# Patient Record
Sex: FEMALE | Race: WHITE | NOT HISPANIC OR LATINO | URBAN - METROPOLITAN AREA
[De-identification: names, ages, dates, MRNs, and addresses within clinical notes are randomized per-mention and may not be internally consistent; named-entity substitution may affect disease eponyms.]

---

## 2023-06-20 ENCOUNTER — TELEPHONE (OUTPATIENT)
Dept: ALLERGY | Facility: CLINIC | Age: 78
End: 2023-06-20
Payer: MEDICARE

## 2023-06-20 NOTE — TELEPHONE ENCOUNTER
Called pt to assist with scheduling a new pt appointment with Dr. Weston    ----- Message from Halie Boyd sent at 6/20/2023 11:23 AM CDT -----  Radha Hinton calling regarding Appointment Access for being referred to the doctor for having Hives/rash, call back to schedule [phone]

## 2023-07-18 ENCOUNTER — OFFICE VISIT (OUTPATIENT)
Dept: ALLERGY | Facility: CLINIC | Age: 78
End: 2023-07-18
Payer: MEDICARE

## 2023-07-18 ENCOUNTER — LAB VISIT (OUTPATIENT)
Dept: LAB | Facility: HOSPITAL | Age: 78
End: 2023-07-18
Attending: ALLERGY & IMMUNOLOGY
Payer: MEDICARE

## 2023-07-18 VITALS
HEART RATE: 93 BPM | DIASTOLIC BLOOD PRESSURE: 84 MMHG | WEIGHT: 158.94 LBS | OXYGEN SATURATION: 96 % | SYSTOLIC BLOOD PRESSURE: 150 MMHG

## 2023-07-18 DIAGNOSIS — E03.9 HYPOTHYROIDISM, UNSPECIFIED TYPE: ICD-10-CM

## 2023-07-18 DIAGNOSIS — L50.1 CHRONIC IDIOPATHIC URTICARIA: ICD-10-CM

## 2023-07-18 DIAGNOSIS — L50.8 CHRONIC URTICARIA: Primary | ICD-10-CM

## 2023-07-18 LAB
BASOPHILS # BLD AUTO: 0.05 K/UL (ref 0–0.2)
BASOPHILS NFR BLD: 0.6 % (ref 0–1.9)
DIFFERENTIAL METHOD: NORMAL
EOSINOPHIL # BLD AUTO: 0.3 K/UL (ref 0–0.5)
EOSINOPHIL NFR BLD: 3.5 % (ref 0–8)
ERYTHROCYTE [DISTWIDTH] IN BLOOD BY AUTOMATED COUNT: 13.6 % (ref 11.5–14.5)
HCT VFR BLD AUTO: 45.6 % (ref 37–48.5)
HGB BLD-MCNC: 14.8 G/DL (ref 12–16)
IMM GRANULOCYTES # BLD AUTO: 0.02 K/UL (ref 0–0.04)
IMM GRANULOCYTES NFR BLD AUTO: 0.2 % (ref 0–0.5)
LYMPHOCYTES # BLD AUTO: 1.8 K/UL (ref 1–4.8)
LYMPHOCYTES NFR BLD: 21.4 % (ref 18–48)
MCH RBC QN AUTO: 29 PG (ref 27–31)
MCHC RBC AUTO-ENTMCNC: 32.5 G/DL (ref 32–36)
MCV RBC AUTO: 89 FL (ref 82–98)
MONOCYTES # BLD AUTO: 0.7 K/UL (ref 0.3–1)
MONOCYTES NFR BLD: 8.4 % (ref 4–15)
NEUTROPHILS # BLD AUTO: 5.4 K/UL (ref 1.8–7.7)
NEUTROPHILS NFR BLD: 65.9 % (ref 38–73)
NRBC BLD-RTO: 0 /100 WBC
PLATELET # BLD AUTO: 275 K/UL (ref 150–450)
PMV BLD AUTO: 10 FL (ref 9.2–12.9)
RBC # BLD AUTO: 5.1 M/UL (ref 4–5.4)
THYROGLOB AB SERPL IA-ACNC: <4 IU/ML (ref 0–3.9)
THYROPEROXIDASE IGG SERPL-ACNC: <6 IU/ML
TSH SERPL DL<=0.005 MIU/L-ACNC: 1.49 UIU/ML (ref 0.4–4)
WBC # BLD AUTO: 8.21 K/UL (ref 3.9–12.7)

## 2023-07-18 PROCEDURE — 85025 COMPLETE CBC W/AUTO DIFF WBC: CPT | Performed by: ALLERGY & IMMUNOLOGY

## 2023-07-18 PROCEDURE — 99204 PR OFFICE/OUTPT VISIT, NEW, LEVL IV, 45-59 MIN: ICD-10-PCS | Mod: S$PBB,,, | Performed by: ALLERGY & IMMUNOLOGY

## 2023-07-18 PROCEDURE — 84165 PATHOLOGIST INTERPRETATION SPE: ICD-10-PCS | Mod: 26,,, | Performed by: PATHOLOGY

## 2023-07-18 PROCEDURE — 99999 PR PBB SHADOW E&M-EST. PATIENT-LVL III: ICD-10-PCS | Mod: PBBFAC,,, | Performed by: ALLERGY & IMMUNOLOGY

## 2023-07-18 PROCEDURE — 86003 ALLG SPEC IGE CRUDE XTRC EA: CPT | Mod: 59 | Performed by: ALLERGY & IMMUNOLOGY

## 2023-07-18 PROCEDURE — 86003 ALLG SPEC IGE CRUDE XTRC EA: CPT | Performed by: ALLERGY & IMMUNOLOGY

## 2023-07-18 PROCEDURE — 86800 THYROGLOBULIN ANTIBODY: CPT | Performed by: ALLERGY & IMMUNOLOGY

## 2023-07-18 PROCEDURE — 84443 ASSAY THYROID STIM HORMONE: CPT | Performed by: ALLERGY & IMMUNOLOGY

## 2023-07-18 PROCEDURE — 84165 PROTEIN E-PHORESIS SERUM: CPT | Performed by: ALLERGY & IMMUNOLOGY

## 2023-07-18 PROCEDURE — 84165 PROTEIN E-PHORESIS SERUM: CPT | Mod: 26,,, | Performed by: PATHOLOGY

## 2023-07-18 PROCEDURE — 99999 PR PBB SHADOW E&M-EST. PATIENT-LVL III: CPT | Mod: PBBFAC,,, | Performed by: ALLERGY & IMMUNOLOGY

## 2023-07-18 PROCEDURE — 36415 COLL VENOUS BLD VENIPUNCTURE: CPT | Performed by: ALLERGY & IMMUNOLOGY

## 2023-07-18 PROCEDURE — 99213 OFFICE O/P EST LOW 20 MIN: CPT | Mod: PBBFAC | Performed by: ALLERGY & IMMUNOLOGY

## 2023-07-18 PROCEDURE — 99204 OFFICE O/P NEW MOD 45 MIN: CPT | Mod: S$PBB,,, | Performed by: ALLERGY & IMMUNOLOGY

## 2023-07-18 RX ORDER — HYDROCHLOROTHIAZIDE 25 MG/1
1 TABLET ORAL DAILY
COMMUNITY
Start: 2023-03-06 | End: 2023-12-12 | Stop reason: SDUPTHER

## 2023-07-18 RX ORDER — FAMOTIDINE 20 MG/1
20 TABLET, FILM COATED ORAL 2 TIMES DAILY
COMMUNITY
Start: 2023-06-26 | End: 2023-09-26 | Stop reason: SDUPTHER

## 2023-07-18 RX ORDER — AMLODIPINE BESYLATE 5 MG/1
5 TABLET ORAL
COMMUNITY
Start: 2023-06-28

## 2023-07-18 RX ORDER — LEVOTHYROXINE SODIUM 25 UG/1
25 TABLET ORAL
COMMUNITY
Start: 2023-05-19 | End: 2023-12-12 | Stop reason: SDUPTHER

## 2023-07-18 RX ORDER — ESZOPICLONE 3 MG/1
1 TABLET, FILM COATED ORAL NIGHTLY PRN
COMMUNITY
Start: 2023-03-16 | End: 2023-12-12 | Stop reason: SDUPTHER

## 2023-07-18 RX ORDER — HYDROXYZINE HYDROCHLORIDE 50 MG/1
50 TABLET, FILM COATED ORAL 3 TIMES DAILY PRN
COMMUNITY

## 2023-07-18 RX ORDER — AMLODIPINE BESYLATE 5 MG/1
1 TABLET ORAL DAILY
COMMUNITY
Start: 2023-06-28 | End: 2023-12-12 | Stop reason: SDUPTHER

## 2023-07-18 RX ORDER — NABUMETONE 500 MG/1
TABLET, FILM COATED ORAL
COMMUNITY
Start: 2023-07-17

## 2023-07-18 RX ORDER — HYDROCHLOROTHIAZIDE 25 MG/1
25 TABLET ORAL
COMMUNITY
Start: 2023-06-01

## 2023-07-18 RX ORDER — HYDROXYZINE HYDROCHLORIDE 25 MG/1
TABLET, FILM COATED ORAL
COMMUNITY
Start: 2023-06-24

## 2023-07-18 RX ORDER — LEVOTHYROXINE SODIUM 25 UG/1
TABLET ORAL
COMMUNITY
Start: 2023-05-19

## 2023-07-18 RX ORDER — CETIRIZINE HYDROCHLORIDE 10 MG/1
20 TABLET ORAL 2 TIMES DAILY
Qty: 120 TABLET | Refills: 6 | Status: SHIPPED | OUTPATIENT
Start: 2023-07-18 | End: 2024-07-17

## 2023-07-18 RX ORDER — FERROUS SULFATE, DRIED 160(50) MG
1 TABLET, EXTENDED RELEASE ORAL
COMMUNITY

## 2023-07-18 RX ORDER — POTASSIUM CHLORIDE 1500 MG/1
20 TABLET, EXTENDED RELEASE ORAL
COMMUNITY
Start: 2023-03-04

## 2023-07-18 RX ORDER — ESZOPICLONE 3 MG/1
3 TABLET, FILM COATED ORAL NIGHTLY
COMMUNITY
Start: 2023-03-16

## 2023-07-18 NOTE — PROGRESS NOTES
Subjective:       Patient ID: Radha Hinton is a 77 y.o. female.      Chief Complaint:  Allergies, Urticaria, and Rash (First consultation for Hives.  In August 2022, she had a terrible itch and a rash that was persistent and now it has returned.  Previously she was prescribed Zyrtec and Allegra by her Dermatologist and she took as prescribed with good effect, now the same issue has come back and she went back to her Dermatologist who told her it is time to see an Allergists. )      HPI:   Radha Hinton is here for evaluation of chronic urticaria. Patient's symptoms include urticaria. Hives are described as a red, raised, itchy, and spreading skin rash that occurs on the entire body. Different areas at different time. The patient has had these symptoms for 1 year. Started in Aug '22. In early '23 had a few months w/o urticaria. It recurred in April '23.  Each individual hive lasts less than 24 hours. These lesions are pruritic and not painful. They heal without scarring. The patient has tried the following medications for control of these symptoms: over-the-counter antihistamines and prescription antihistamines. Currently on 10 mg certirizine and 20 mg famotidine twice daily with 25 mg hydroxyzine at night. These medications offer good relief of symptoms.  There has not been laryngeal/throat involvement. The patient has not required Emergency Room evaluation and treatment for these symptoms.  Possible triggers have not been identified.   Recent illness: no  Association w/ particular foods no  Environmental triggers:  more commonly noted inside  Association w NSAIDs: no  Worse w physical pressure (dermatographism):no  Worse w temperature extremes:no  Worse w exercise/exertion:no  Worse w stress: no. Not obvious.  She is on synthroid for hypothyroidism  Reports she is otherwise in baseline state of health. No constitutional sx's.  Prev seen by dermatologist, Dr. Saldana    No AR, no asthma, no food  allergy        Past Medical History:   Diagnosis Date    Allergy     Urticaria        History reviewed. No pertinent family history.      Review of Systems   Constitutional:  Negative for activity change, fatigue and fever.   HENT:  Negative for congestion, postnasal drip, rhinorrhea, sinus pressure and sneezing.    Eyes:  Negative for discharge, redness and itching.   Respiratory:  Negative for cough, shortness of breath and wheezing.    Cardiovascular:  Negative for chest pain.   Gastrointestinal:  Negative for diarrhea, nausea and vomiting.   Genitourinary:  Negative for dysuria.   Musculoskeletal:  Negative for arthralgias and joint swelling.   Skin:  Negative for rash.   Neurological:  Negative for headaches.   Hematological:  Does not bruise/bleed easily.   Psychiatric/Behavioral:  Negative for behavioral problems and sleep disturbance.         Objective:   Physical Exam  Vitals and nursing note reviewed.   Constitutional:       General: She is not in acute distress.     Appearance: She is well-developed.   HENT:      Head: Normocephalic.      Right Ear: External ear normal.      Left Ear: External ear normal.      Nose: No septal deviation, mucosal edema or rhinorrhea.      Right Sinus: No maxillary sinus tenderness or frontal sinus tenderness.      Left Sinus: No maxillary sinus tenderness or frontal sinus tenderness.      Mouth/Throat:      Pharynx: Uvula midline. No uvula swelling.   Eyes:      General:         Right eye: No discharge.         Left eye: No discharge.   Cardiovascular:      Rate and Rhythm: Normal rate.   Pulmonary:      Effort: Pulmonary effort is normal. No respiratory distress.   Abdominal:      Tenderness: There is no abdominal tenderness.   Musculoskeletal:      Cervical back: Normal range of motion.   Lymphadenopathy:      Cervical: No cervical adenopathy.   Skin:     Findings: No erythema or rash.   Neurological:      Mental Status: She is alert and oriented to person, place, and  time.   Psychiatric:         Behavior: Behavior normal.     Recent labs in epic reviewed      Assessment:       1. Chronic urticaria, consider spontaneous v autoimmune   2. Hypothyroidism, unspecified type         Plan:       Radha was seen today for allergies, urticaria and rash.    Diagnoses and all orders for this visit:    Chronic urticaria  -     cetirizine (ZYRTEC) 10 MG tablet; Take 2 tablets (20 mg total) by mouth 2 (two) times a day. For chronic urticaria  -famotidine 20 mg twice daily  -wean atarax at night to prn    -     CBC Auto Differential; Future  -     Thyroid Peroxidase Antibody; Future  -     Anti-Thyroglobulin Antibody; Future  -     TSH; Future  -     PROTEIN ELECTROPHORESIS, SERUM; Future  -     D. pteronyssinus IgE; Future  -     D. farinae IgE; Future    Hypothyroidism, unspecified type  -     Thyroid Peroxidase Antibody; Future  -     Anti-Thyroglobulin Antibody; Future  -     TSH; Future         Total of 50+ minutes on encounter the day of the visit. This includes face to face time and non-face to face time preparing to see the patient (eg, review of tests), obtaining and/or reviewing separately obtained history, documenting clinical information in the electronic or other health record, independently interpreting results and communicating results to the patient/family/caregiver, or care coordinator.

## 2023-07-19 LAB
ALBUMIN SERPL ELPH-MCNC: 4.53 G/DL (ref 3.35–5.55)
ALPHA1 GLOB SERPL ELPH-MCNC: 0.32 G/DL (ref 0.17–0.41)
ALPHA2 GLOB SERPL ELPH-MCNC: 0.99 G/DL (ref 0.43–0.99)
B-GLOBULIN SERPL ELPH-MCNC: 0.79 G/DL (ref 0.5–1.1)
GAMMA GLOB SERPL ELPH-MCNC: 0.68 G/DL (ref 0.67–1.58)
PATHOLOGIST INTERPRETATION SPE: NORMAL
PROT SERPL-MCNC: 7.3 G/DL (ref 6–8.4)

## 2023-07-21 LAB
D FARINAE IGE QN: <0.1 KU/L
D PTERONYSS IGE QN: <0.1 KU/L
DEPRECATED D FARINAE IGE RAST QL: NORMAL
DEPRECATED D PTERONYSS IGE RAST QL: NORMAL

## 2023-07-25 ENCOUNTER — OFFICE VISIT (OUTPATIENT)
Dept: ALLERGY | Facility: CLINIC | Age: 78
End: 2023-07-25
Payer: MEDICARE

## 2023-07-25 VITALS
HEART RATE: 89 BPM | DIASTOLIC BLOOD PRESSURE: 90 MMHG | SYSTOLIC BLOOD PRESSURE: 150 MMHG | WEIGHT: 160.94 LBS | OXYGEN SATURATION: 98 %

## 2023-07-25 DIAGNOSIS — E03.9 HYPOTHYROIDISM, UNSPECIFIED TYPE: ICD-10-CM

## 2023-07-25 DIAGNOSIS — L50.8 CHRONIC URTICARIA: Primary | ICD-10-CM

## 2023-07-25 PROCEDURE — 99999 PR PBB SHADOW E&M-EST. PATIENT-LVL III: CPT | Mod: PBBFAC,,, | Performed by: ALLERGY & IMMUNOLOGY

## 2023-07-25 PROCEDURE — 99214 PR OFFICE/OUTPT VISIT, EST, LEVL IV, 30-39 MIN: ICD-10-PCS | Mod: S$PBB,,, | Performed by: ALLERGY & IMMUNOLOGY

## 2023-07-25 PROCEDURE — 99214 OFFICE O/P EST MOD 30 MIN: CPT | Mod: S$PBB,,, | Performed by: ALLERGY & IMMUNOLOGY

## 2023-07-25 PROCEDURE — 99213 OFFICE O/P EST LOW 20 MIN: CPT | Mod: PBBFAC | Performed by: ALLERGY & IMMUNOLOGY

## 2023-07-25 PROCEDURE — 99999 PR PBB SHADOW E&M-EST. PATIENT-LVL III: ICD-10-PCS | Mod: PBBFAC,,, | Performed by: ALLERGY & IMMUNOLOGY

## 2023-07-25 RX ORDER — HYDROXYZINE HYDROCHLORIDE 25 MG/1
25 TABLET, FILM COATED ORAL 3 TIMES DAILY PRN
Qty: 60 TABLET | Refills: 4 | Status: SHIPPED | OUTPATIENT
Start: 2023-07-25

## 2023-07-25 RX ORDER — PREDNISONE 10 MG/1
TABLET ORAL
Qty: 21 TABLET | Refills: 1 | Status: SHIPPED | OUTPATIENT
Start: 2023-07-25

## 2023-07-25 NOTE — PROGRESS NOTES
Subjective:       Patient ID: Radha Hinton is a 77 y.o. female.      Chief Complaint:  Allergies and Follow-up (Follow Up for allergies and to discuss Labs results, and she has had no relief she states she is taking twice as much meds with not too good effects. )      HPI:     Pt presents w  for fu chronic spontaneous urticaria. Labs from  were unrmarkable.  Continues to c/o frequent pruritus, occ outbreaks of urticaria, despite 20 mg cetirizine and 20 mg famotidine twice daily on routine basis. Almost nightly takes 1-2 extra doses 25 mg hydroxyzine for breakthrough itching, and frequently takes extra 10 mg cetirizine mid day for breakthrough pruritus. Duration of benefits from antihistamines seems to be about 1/2 of what is expected. Pruritus is more prominent, frequent than appearance of urticaria lately.    Hx from 7/18/23:  Radha Hinton is here for evaluation of chronic urticaria. Patient's symptoms include urticaria. Hives are described as a red, raised, itchy, and spreading skin rash that occurs on the entire body. Different areas at different time. The patient has had these symptoms for 1 year. Started in Aug '22. In early '23 had a few months w/o urticaria. It recurred in April '23.  Each individual hive lasts less than 24 hours. These lesions are pruritic and not painful. They heal without scarring. The patient has tried the following medications for control of these symptoms: over-the-counter antihistamines and prescription antihistamines. Currently on 10 mg certirizine and 20 mg famotidine twice daily with 25 mg hydroxyzine at night. These medications offer good relief of symptoms.  There has not been laryngeal/throat involvement. The patient has not required Emergency Room evaluation and treatment for these symptoms.  Possible triggers have not been identified.   Recent illness: no  Association w/ particular foods no  Environmental triggers:  more commonly noted inside  Association w  NSAIDs: no  Worse w physical pressure (dermatographism):no  Worse w temperature extremes:no  Worse w exercise/exertion:no  Worse w stress: no. Not obvious.  She is on synthroid for hypothyroidism  Reports she is otherwise in baseline state of health. No constitutional sx's.  Prev seen by dermatologist, Dr. Saldana    No AR, no asthma, no food allergy        Past Medical History:   Diagnosis Date    Allergy     Urticaria        History reviewed. No pertinent family history.      Review of Systems   Constitutional:  Negative for activity change, fatigue and fever.   HENT:  Negative for congestion, postnasal drip, rhinorrhea, sinus pressure and sneezing.    Eyes:  Negative for discharge, redness and itching.   Respiratory:  Negative for cough, shortness of breath and wheezing.    Cardiovascular:  Negative for chest pain.   Gastrointestinal:  Negative for diarrhea, nausea and vomiting.   Genitourinary:  Negative for dysuria.   Musculoskeletal:  Negative for arthralgias and joint swelling.   Skin:  Negative for rash.        Pruritus, urticaria   Neurological:  Negative for headaches.   Hematological:  Does not bruise/bleed easily.   Psychiatric/Behavioral:  Negative for behavioral problems and sleep disturbance.         Objective:   Physical Exam  Vitals and nursing note reviewed.   Constitutional:       General: She is not in acute distress.     Appearance: She is well-developed.   HENT:      Head: Normocephalic.      Right Ear: External ear normal.      Left Ear: External ear normal.      Nose: No septal deviation, mucosal edema or rhinorrhea.      Right Sinus: No maxillary sinus tenderness or frontal sinus tenderness.      Left Sinus: No maxillary sinus tenderness or frontal sinus tenderness.      Mouth/Throat:      Pharynx: Uvula midline. No uvula swelling.   Eyes:      General:         Right eye: No discharge.         Left eye: No discharge.   Cardiovascular:      Rate and Rhythm: Normal rate.   Pulmonary:       Effort: Pulmonary effort is normal. No respiratory distress.   Abdominal:      Tenderness: There is no abdominal tenderness.   Musculoskeletal:      Cervical back: Normal range of motion.   Lymphadenopathy:      Cervical: No cervical adenopathy.   Skin:     Findings: No erythema or rash.   Neurological:      Mental Status: She is alert and oriented to person, place, and time.   Psychiatric:         Behavior: Behavior normal.     Recent labs in epic reviewed  Nl cbc, tsh, spep, cmp. Neg IgE to DM      Assessment:       1. Chronic urticaria, consider spontaneous v autoimmune   2. Hypothyroidism, unspecified type         Plan:       Radha was seen today for allergies, urticaria and rash.    Diagnoses and all orders for this visit:    Chronic urticaria  -     cetirizine (ZYRTEC) 10 MG tablet; Take 2 tablets (20 mg total) by mouth 2 (two) times a day. For chronic urticaria  -famotidine 20 mg twice daily  -prn atarax for breakthrough itching    Recommend xolair. Pt will consider and let us know if/when ready for me to write rx.    Meanwhile can try previously rx'd gabapentin  Rec aggressive moisturization of skin             Total of 40 minutes on encounter the day of the visit. This includes face to face time and non-face to face time preparing to see the patient (eg, review of tests), obtaining and/or reviewing separately obtained history, documenting clinical information in the electronic or other health record, independently interpreting results and communicating results to the patient/family/caregiver, or care coordinator.

## 2023-07-31 ENCOUNTER — LAB VISIT (OUTPATIENT)
Dept: LAB | Facility: HOSPITAL | Age: 78
End: 2023-07-31
Payer: MEDICARE

## 2023-07-31 ENCOUNTER — OFFICE VISIT (OUTPATIENT)
Dept: ALLERGY | Facility: CLINIC | Age: 78
End: 2023-07-31
Payer: MEDICARE

## 2023-07-31 VITALS
SYSTOLIC BLOOD PRESSURE: 137 MMHG | HEART RATE: 93 BPM | OXYGEN SATURATION: 97 % | WEIGHT: 160.69 LBS | DIASTOLIC BLOOD PRESSURE: 72 MMHG

## 2023-07-31 DIAGNOSIS — E55.9 VITAMIN D INSUFFICIENCY: ICD-10-CM

## 2023-07-31 DIAGNOSIS — E03.9 HYPOTHYROIDISM, UNSPECIFIED TYPE: ICD-10-CM

## 2023-07-31 DIAGNOSIS — L50.1 IDIOPATHIC URTICARIA: ICD-10-CM

## 2023-07-31 DIAGNOSIS — E55.9 VITAMIN D INSUFFICIENCY: Primary | ICD-10-CM

## 2023-07-31 DIAGNOSIS — G45.9 TIA (TRANSIENT ISCHEMIC ATTACK): ICD-10-CM

## 2023-07-31 DIAGNOSIS — T78.3XXA ANGIOEDEMA, INITIAL ENCOUNTER: ICD-10-CM

## 2023-07-31 DIAGNOSIS — I25.10 ASCVD (ARTERIOSCLEROTIC CARDIOVASCULAR DISEASE): ICD-10-CM

## 2023-07-31 DIAGNOSIS — I10 HYPERTENSION, ESSENTIAL: ICD-10-CM

## 2023-07-31 LAB — 25(OH)D3+25(OH)D2 SERPL-MCNC: 96 NG/ML (ref 30–96)

## 2023-07-31 PROCEDURE — 36415 COLL VENOUS BLD VENIPUNCTURE: CPT | Performed by: ALLERGY & IMMUNOLOGY

## 2023-07-31 PROCEDURE — 82306 VITAMIN D 25 HYDROXY: CPT | Performed by: ALLERGY & IMMUNOLOGY

## 2023-07-31 PROCEDURE — 99999 PR PBB SHADOW E&M-EST. PATIENT-LVL III: ICD-10-PCS | Mod: PBBFAC,,, | Performed by: ALLERGY & IMMUNOLOGY

## 2023-07-31 PROCEDURE — 99213 OFFICE O/P EST LOW 20 MIN: CPT | Mod: PBBFAC | Performed by: ALLERGY & IMMUNOLOGY

## 2023-07-31 PROCEDURE — 99215 OFFICE O/P EST HI 40 MIN: CPT | Mod: S$PBB,,, | Performed by: ALLERGY & IMMUNOLOGY

## 2023-07-31 PROCEDURE — 99215 PR OFFICE/OUTPT VISIT, EST, LEVL V, 40-54 MIN: ICD-10-PCS | Mod: S$PBB,,, | Performed by: ALLERGY & IMMUNOLOGY

## 2023-07-31 PROCEDURE — 88184 FLOWCYTOMETRY/ TC 1 MARKER: CPT | Performed by: ALLERGY & IMMUNOLOGY

## 2023-07-31 PROCEDURE — 99999 PR PBB SHADOW E&M-EST. PATIENT-LVL III: CPT | Mod: PBBFAC,,, | Performed by: ALLERGY & IMMUNOLOGY

## 2023-07-31 NOTE — PROGRESS NOTES
Radha Hinton is a 77-year-old female who presents to clinic today for continued evaluation of urticaria and angioedema.  She is here with her  Rakesh.      She last saw Dr. Trujillo in Allergy on July 25, 2023.  She initially saw him on July 18, 2023 on referral from Dr. Christine Saldana her dermatologist.    She was diagnosed with hypothyroidism in 2021 with an elevated TSH of 5.20 on January 14, 2022.  She has been on levothyroxine since then.  Her TSH since then has been normal.  She is followed by her primary care physician Dr. Oscar Elizalde.    In August 2022 she developed increased itching with linear lesions where she scratched. She then developed increased urticarial lesions that were red, raised, and pruritic.    She saw Dr. Saldana on August 23, 2022 and was started on Zyrtec and Allegra.  She took one Zyrtec in the morning for a month and then one Allegra at night for two months.  She did this and when she discontinued the Allegra, she did not have any further urticaria for many months.      She had a right knee replacement on April 25, 2023.  Two weeks prior to this she developed increased itching with the urticarial rash again.    Her symptoms have persisted since then.  She has been taking Zyrtec up to four day recently and Pepcid 20 milligrams twice a day. She has also been taking one or two hydroxyzine at night.    She tolerates the hydroxyzine and does have difficulty sleeping.    She has hypertension and was taking potassium supplements. She was told not to take this with Benadryl.    She denies any rhinitis, conjunctivitis, cough, wheezing, shortness of breath, or history of asthma.    For ROS, FH, SH please see Allergy and Asthma Questionnaire dated today.    Some relevant pertinent positives:    Review of Systems/PMH:  She had a right hip replacement on June 14, 2022.  She had a right knee replacement on April 25, 2023.  She had a TIA in 2001 and has been on aspirin daily since then.   She is hypertension and takes hydrochlorothiazide and amlodipine.    Family History:  Negative for atopic disease.    Home environment:  She has lived in the same house for the past 16 years. There was no water damage. There is no evidence of mold. There is no carpeting in the bedroom. There are no pets.    Social History:  She was born in [place] and moved to the [place] in early childhood.  Her parents were Holocaust survivors.  She is a retired teacher.  Her  is a retired .    Physical Examination:  General: Well-developed, well-nourished, no acute distress.  Head: No sinus tenderness.  Eyes: Conjunctivae:  No bulbar or palpebral conjunctival injection.  Ears: EAC's clear.  TM's clear.  No pre-auricular nodes.  Nose: Nasal Mucosa:  Pink.  Septum: No apparent deviation.  Turbinates:  No significant edema.  Polyps/Mass:  None visible.  Teeth/Gums:  No bleeding noted.  Oropharynx: No exudates.  Neck: Supple without thyromegaly. No cervical lymphadenopathy.    Respiratory/Chest: Effort: Good.  Auscultation:  Clear bilaterally.  Cardiovascular:  No murmur, rubs, or gallop heard.   GI:  Non-tender.  No masses.  No organomegaly.  Extremities:  No cyanosis, clubbing, or edema.  Skin: Good turgor.  No urticaria or angioedema.  Neuro/Psych: Oriented x 3.    Pictures are reviewed on her cell phone on July 31, 2023 and they are consistent with urticaria.    Laboratory 07/18/2023:   ImmunoCAP dust mite: Negative.  CBC: Normal.   TSH:  1.493.   Thyroid peroxidase antibody level:  Less than 6.0.   Thyroglobulin antibody level:  Less than 4.0.   SPEP:  Normal.    Assessment:  1. Chronic recurrent urticaria and angioedema, probably idiopathic.   2. Hypothyroidism on replacement.   3. 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    4. 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    5. TIA 2001 on aspirin.   6. Hypertension on hydrochlorothiazide and amlodipine.    Recommendations:  One.  Laboratory as  ordered.  2. Continue Zyrtec 20 milligrams twice a day.  3. Increase Pepcid to 40 milligrams twice a day.   4. Atarax 25 milligrams at bedtime. She may increase this to 150 milligrams at night as tolerated.  5. Consider Xolair.   6. Return to clinic in one week.     Patient education August 27, 2023:   Allergic mechanisms and treatment options were reviewed in detail. Urticaria and angioedema were reviewed. Xolair was discussed.    I spent a total of 90 minutes on the day of the visit.This includes face to face time and non-face to face time preparing to see the patient (eg, review of tests), obtaining and/or reviewing separately obtained history, documenting clinical information in the electronic or other health record, independently interpreting results and communicating results to the patient/family/caregiver, or care coordinator.

## 2023-08-02 LAB — TRYPTASE LEVEL: 5 NG/ML

## 2023-08-12 LAB
BASOPHILS %, CD203C: 6.5 % (ref 0–12)
IGE RECEPTOR AB INTERPRETATION:: NORMAL

## 2023-09-26 ENCOUNTER — OFFICE VISIT (OUTPATIENT)
Dept: ALLERGY | Facility: CLINIC | Age: 78
End: 2023-09-26
Payer: MEDICARE

## 2023-09-26 VITALS — BODY MASS INDEX: 30.44 KG/M2 | HEIGHT: 62 IN | WEIGHT: 165.38 LBS

## 2023-09-26 DIAGNOSIS — I25.10 ASCVD (ARTERIOSCLEROTIC CARDIOVASCULAR DISEASE): ICD-10-CM

## 2023-09-26 DIAGNOSIS — I10 HYPERTENSION, ESSENTIAL: ICD-10-CM

## 2023-09-26 DIAGNOSIS — T78.3XXD ANGIOEDEMA, SUBSEQUENT ENCOUNTER: ICD-10-CM

## 2023-09-26 DIAGNOSIS — L50.1 IDIOPATHIC URTICARIA: Primary | ICD-10-CM

## 2023-09-26 DIAGNOSIS — E03.9 HYPOTHYROIDISM, UNSPECIFIED TYPE: ICD-10-CM

## 2023-09-26 PROCEDURE — 99999 PR PBB SHADOW E&M-EST. PATIENT-LVL III: CPT | Mod: PBBFAC,,, | Performed by: ALLERGY & IMMUNOLOGY

## 2023-09-26 PROCEDURE — 99214 OFFICE O/P EST MOD 30 MIN: CPT | Mod: S$PBB,,, | Performed by: ALLERGY & IMMUNOLOGY

## 2023-09-26 PROCEDURE — 99999 PR PBB SHADOW E&M-EST. PATIENT-LVL III: ICD-10-PCS | Mod: PBBFAC,,, | Performed by: ALLERGY & IMMUNOLOGY

## 2023-09-26 PROCEDURE — 99214 PR OFFICE/OUTPT VISIT, EST, LEVL IV, 30-39 MIN: ICD-10-PCS | Mod: S$PBB,,, | Performed by: ALLERGY & IMMUNOLOGY

## 2023-09-26 PROCEDURE — 99213 OFFICE O/P EST LOW 20 MIN: CPT | Mod: PBBFAC | Performed by: ALLERGY & IMMUNOLOGY

## 2023-09-26 RX ORDER — FAMOTIDINE 20 MG/1
40 TABLET, FILM COATED ORAL 2 TIMES DAILY
Qty: 180 TABLET | Refills: 3 | Status: SHIPPED | OUTPATIENT
Start: 2023-09-26

## 2023-09-26 RX ORDER — FAMOTIDINE 20 MG/1
1 TABLET, FILM COATED ORAL 2 TIMES DAILY
COMMUNITY
Start: 2023-06-26 | End: 2023-12-12 | Stop reason: SDUPTHER

## 2023-09-26 RX ORDER — ROSUVASTATIN CALCIUM 10 MG/1
1 TABLET, COATED ORAL DAILY
COMMUNITY
Start: 2023-09-12 | End: 2023-11-07

## 2023-09-26 RX ORDER — ROSUVASTATIN CALCIUM 10 MG/1
10 TABLET, COATED ORAL
COMMUNITY
Start: 2023-09-12

## 2023-09-26 NOTE — PROGRESS NOTES
Radha Hinton returns to clinic today for continued evaluation of chronic urticaria and angioedema.  She was last seen July 31, 2023. She is with her  Rakesh.    Since her last visit, she is been taking Zyrtec 20 milligrams twice a day as well as Pepcid 40 milligrams twice a day.     While on this her urticaria and angioedema have been well-controlled.  She has only had a few small lesions and some itching.  She has not had any angioedema.    Since she has been so well controlled.  She has on several occasions tried to reduce her Pepcid.  She did have some increased itching and has now been taken 40 milligrams twice a day for the past three days.     She has tried Atarax on several occasions. She does have difficulty sleeping and takes Lunesta.  She does sleep better after taking Atarax.    She denies any rhinitis or conjunctivitis. She denies any cough, wheezing, shortness of breath, or history of asthma.    Physical Examination:  General: Well-developed, well-nourished, no acute distress.  Skin: Good turgor.  No urticaria or angioedema.  Neuro/Psych: Oriented x 3.    Pictures are reviewed on her cell phone on July 31, 2023 and they are consistent with urticaria.    Laboratory 07/18/2023:   ImmunoCAP dust mite: Negative.  CBC: Normal.   TSH:  1.493.   Thyroid peroxidase antibody level:  Less than 6.0.   Thyroglobulin antibody level:  Less than 4.0.   SPEP:  Normal.    Laboratory 07/31/2023:   Anti IgE receptor antibody level:  6.5.   Vitamin-D level:  96.   Serum tryptase:  5.0.    Assessment:  1. Chronic recurrent urticaria and angioedema, probably idiopathic.   2. Hypothyroidism on replacement.   3. 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    4. 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    5. TIA 2001 on aspirin.   6. Hypertension on hydrochlorothiazide and amlodipine.    Recommendations:  1. Continue Zyrtec 20 milligrams twice a day.   2. Continue Pepcid 40 milligrams twice a day.  She may begin to reduce the  Pepcid by 20 milligrams in the afternoon.  She does have 20 milligram tablets at home.  3. Atarax as needed.   4. Xolair again discussed.  5. Return to clinic in 6 to 8 weeks or sooner if needed.    Patient education August 27, 2023:   Allergic mechanisms and treatment options were reviewed in detail. Urticaria and angioedema were reviewed. Xolair was discussed.

## 2023-11-07 ENCOUNTER — OFFICE VISIT (OUTPATIENT)
Dept: ALLERGY | Facility: CLINIC | Age: 78
End: 2023-11-07
Payer: MEDICARE

## 2023-11-07 VITALS
WEIGHT: 169.06 LBS | BODY MASS INDEX: 30.93 KG/M2 | SYSTOLIC BLOOD PRESSURE: 133 MMHG | DIASTOLIC BLOOD PRESSURE: 70 MMHG | HEART RATE: 77 BPM

## 2023-11-07 DIAGNOSIS — E03.9 HYPOTHYROIDISM, UNSPECIFIED TYPE: ICD-10-CM

## 2023-11-07 DIAGNOSIS — L50.8 URTICARIA, CHRONIC: Primary | ICD-10-CM

## 2023-11-07 DIAGNOSIS — T78.3XXD ANGIOEDEMA, SUBSEQUENT ENCOUNTER: ICD-10-CM

## 2023-11-07 PROCEDURE — 99999 PR PBB SHADOW E&M-EST. PATIENT-LVL III: CPT | Mod: PBBFAC,,, | Performed by: ALLERGY & IMMUNOLOGY

## 2023-11-07 PROCEDURE — 99999 PR PBB SHADOW E&M-EST. PATIENT-LVL III: ICD-10-PCS | Mod: PBBFAC,,, | Performed by: ALLERGY & IMMUNOLOGY

## 2023-11-07 PROCEDURE — 99213 OFFICE O/P EST LOW 20 MIN: CPT | Mod: PBBFAC | Performed by: ALLERGY & IMMUNOLOGY

## 2023-11-07 PROCEDURE — 99214 OFFICE O/P EST MOD 30 MIN: CPT | Mod: S$PBB,,, | Performed by: ALLERGY & IMMUNOLOGY

## 2023-11-07 PROCEDURE — 99214 PR OFFICE/OUTPT VISIT, EST, LEVL IV, 30-39 MIN: ICD-10-PCS | Mod: S$PBB,,, | Performed by: ALLERGY & IMMUNOLOGY

## 2023-11-07 NOTE — PROGRESS NOTES
Radha Hinton returns to clinic today for continued evaluation of chronic urticaria and angioedema.  She is here with her  Rakesh.  She was last seen September 26, 2021.    After her last visit, she started reduce her famotidine.  She now is not taking any.  She continues to take Zyrtec two in the morning and two in the afternoon.     She occasionally takes an Atarax. This is usually to sleep.  She does not needed for her urticaria.    She has not had any angioedema.    She has had some mild itching but no urticarial lesions.    She has not had any rhinitis or conjunctivitis.  She denies any cough, wheezing, or shortness of breath.    She has been having some lower back pain.  She was told not to use a heating pad as it may increase her urticaria.    Physical Examination:  General: Well-developed, well-nourished, no acute distress.  Skin: Good turgor.  No urticaria or angioedema.  Neuro/Psych: Oriented x 3.    Pictures are reviewed on her cell phone on July 31, 2023 and they are consistent with urticaria.    Laboratory 07/18/2023:   ImmunoCAP dust mite: Negative.  CBC: Normal.   TSH:  1.493.   Thyroid peroxidase antibody level:  Less than 6.0.   Thyroglobulin antibody level:  Less than 4.0.   SPEP:  Normal.    Laboratory 07/31/2023:   Anti IgE receptor antibody level:  6.5.   Vitamin-D level:  96.   Serum tryptase:  5.0.    Assessment:  1. Chronic recurrent urticaria and angioedema, probably idiopathic, improved.   2. Hypothyroidism on replacement.   3. 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    4. 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    5. TIA 2001 on aspirin.   6. Hypertension on hydrochlorothiazide and amlodipine.    Recommendations:  1. Continue Zyrtec 20 milligrams twice a day.   2. Begin to reduce the Zyrtec.  Decrease to 20 milligrams in the morning and 10 milligrams at night.  If doing well after Thanksgiving, decrease to 10 milligrams in the morning and 10 milligrams in the afternoon.  3. Atarax as  needed.   4. She may use heating pad if needed.    5. Return to clinic in 6 to 8 weeks or sooner if needed.    Patient education August 27, 2023:   Allergic mechanisms and treatment options were reviewed in detail. Urticaria and angioedema were reviewed. Xolair was discussed.

## 2023-12-12 ENCOUNTER — OFFICE VISIT (OUTPATIENT)
Dept: ALLERGY | Facility: CLINIC | Age: 78
End: 2023-12-12
Payer: MEDICARE

## 2023-12-12 VITALS — HEIGHT: 62 IN | WEIGHT: 169.75 LBS | BODY MASS INDEX: 31.24 KG/M2

## 2023-12-12 DIAGNOSIS — E03.9 HYPOTHYROIDISM, UNSPECIFIED TYPE: ICD-10-CM

## 2023-12-12 DIAGNOSIS — L50.8 URTICARIA, CHRONIC: Primary | ICD-10-CM

## 2023-12-12 DIAGNOSIS — T78.3XXD ANGIOEDEMA, SUBSEQUENT ENCOUNTER: ICD-10-CM

## 2023-12-12 DIAGNOSIS — I10 HYPERTENSION, UNSPECIFIED TYPE: ICD-10-CM

## 2023-12-12 PROCEDURE — 99213 OFFICE O/P EST LOW 20 MIN: CPT | Mod: PBBFAC | Performed by: ALLERGY & IMMUNOLOGY

## 2023-12-12 PROCEDURE — 99214 OFFICE O/P EST MOD 30 MIN: CPT | Mod: S$PBB,,, | Performed by: ALLERGY & IMMUNOLOGY

## 2023-12-12 PROCEDURE — 99214 PR OFFICE/OUTPT VISIT, EST, LEVL IV, 30-39 MIN: ICD-10-PCS | Mod: S$PBB,,, | Performed by: ALLERGY & IMMUNOLOGY

## 2023-12-12 PROCEDURE — 99999 PR PBB SHADOW E&M-EST. PATIENT-LVL III: CPT | Mod: PBBFAC,,, | Performed by: ALLERGY & IMMUNOLOGY

## 2023-12-12 PROCEDURE — 99999 PR PBB SHADOW E&M-EST. PATIENT-LVL III: ICD-10-PCS | Mod: PBBFAC,,, | Performed by: ALLERGY & IMMUNOLOGY

## 2023-12-12 RX ORDER — PREDNISONE 10 MG/1
TABLET ORAL
Qty: 21 TABLET | Refills: 0 | Status: SHIPPED | OUTPATIENT
Start: 2023-12-12 | End: 2024-01-24 | Stop reason: SDUPTHER

## 2023-12-12 NOTE — PROGRESS NOTES
Radha Hinton returns to clinic today for continued evaluation of chronic urticaria and angioedema.  She is here with her  Rakesh.    After her last visit, she did discontinue the Pepcid without any difficulty and has not needed since.    She also started to decrease her Zyrtec and was able to go about four days on 30 milligrams a day.  She then started having increased itching and increased to 40 milligrams a day again.     Her urticaria and itching were then controlled.     She tried to decrease again in early December but again developed an increased erythematous rash. She increased her Zyrtec to 20 milligrams twice a day and has been well-controlled since.     She has not had any angioedema.     She is tolerating the Zyrtec without any difficulty.    She has not needed any prednisone.    She only takes Atarax for sleep.    Physical Examination:  General: Well-developed, well-nourished, no acute distress.  Skin: Good turgor.  No urticaria or angioedema.  Neuro/Psych: Oriented x 3.    Pictures are reviewed on her cell phone on July 31, 2023 and they are consistent with urticaria.    Laboratory 07/18/2023:   ImmunoCAP dust mite: Negative.  CBC: Normal.   TSH:  1.493.   Thyroid peroxidase antibody level:  Less than 6.0.   Thyroglobulin antibody level:  Less than 4.0.   SPEP:  Normal.    Laboratory 07/31/2023:   Anti IgE receptor antibody level:  6.5.   Vitamin-D level:  96.   Serum tryptase:  5.0.    Assessment:  1. Chronic recurrent urticaria and angioedema, probably idiopathic, improved.   2. Hypothyroidism on replacement.   3. 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    4. 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    5. TIA 2001 on aspirin.   6. Hypertension on hydrochlorothiazide and amlodipine.    Recommendations:  1. Continue Zyrtec 20 milligrams twice a day.  She will not decrease this again for two months.  2. Atarax as needed.  3. Return to clinic in eight weeks or sooner if needed.    Patient education  August 27, 2023:   Allergic mechanisms and treatment options were reviewed in detail. Urticaria and angioedema were reviewed. Xolair was discussed.

## 2024-01-24 ENCOUNTER — OFFICE VISIT (OUTPATIENT)
Dept: ALLERGY | Facility: CLINIC | Age: 79
End: 2024-01-24
Payer: MEDICARE

## 2024-01-24 VITALS — HEIGHT: 62 IN | WEIGHT: 173.06 LBS | BODY MASS INDEX: 31.85 KG/M2

## 2024-01-24 DIAGNOSIS — L50.1 IDIOPATHIC URTICARIA: Primary | ICD-10-CM

## 2024-01-24 DIAGNOSIS — E03.9 HYPOTHYROIDISM, UNSPECIFIED TYPE: ICD-10-CM

## 2024-01-24 DIAGNOSIS — T78.3XXD ANGIOEDEMA, SUBSEQUENT ENCOUNTER: ICD-10-CM

## 2024-01-24 PROCEDURE — 99214 OFFICE O/P EST MOD 30 MIN: CPT | Mod: S$PBB,,, | Performed by: ALLERGY & IMMUNOLOGY

## 2024-01-24 PROCEDURE — 99213 OFFICE O/P EST LOW 20 MIN: CPT | Mod: PBBFAC | Performed by: ALLERGY & IMMUNOLOGY

## 2024-01-24 PROCEDURE — 99999 PR PBB SHADOW E&M-EST. PATIENT-LVL III: CPT | Mod: PBBFAC,,, | Performed by: ALLERGY & IMMUNOLOGY

## 2024-01-24 RX ORDER — PREDNISONE 10 MG/1
TABLET ORAL
Qty: 21 TABLET | Refills: 0 | Status: SHIPPED | OUTPATIENT
Start: 2024-01-24

## 2024-01-24 NOTE — PROGRESS NOTES
Radha Hinton returns to clinic today for continued evaluation of chronic idiopathic urticaria and angioedema.  She is here with her  Rakesh. She was last seen December 12, 2023.    After her last visit, she continued to take Zyrtec 20 milligrams in the morning and 20 milligrams in the afternoon.  She was doing well until December 30th when she developed a rash around 4:30 p.m.. The lesions were red, raised, and pruritic.  They were similar to her urticaria in the past.     She also developed increased generalized itching.  She continued to have urticaria and itching the following day and took 20 milligrams of Zyrtec the morning and 20 milligrams in the afternoon.  She also added 40 milligrams of Pepcid in the morning and 40 milligrams at night.     On New Year's Aimee she came home and had increased itching.  She took two Atarax.  The following morning she woke up and continued to have symptoms.  She started prednisone around 6:00 a.m..  She continued to take Zyrtec and Pepcid twice a day for the next several days.     As she took more prednisone, her rash and itching improved.  It then resolved on about day 3.    She did not take any pictures.    She is no longer taking any Atarax.    There is no association with any food, contact, or ingestion.    She continues to have pain in her right knee.    Physical Examination:  General: Well-developed, well-nourished, no acute distress.  Skin: Good turgor.  No urticaria or angioedema.  Neuro/Psych: Oriented x 3.    Pictures are reviewed on her cell phone on July 31, 2023 and they are consistent with urticaria.    Laboratory 07/18/2023:   ImmunoCAP dust mite: Negative.  CBC: Normal.   TSH:  1.493.   Thyroid peroxidase antibody level:  Less than 6.0.   Thyroglobulin antibody level:  Less than 4.0.   SPEP:  Normal.    Laboratory 07/31/2023:   Anti IgE receptor antibody level:  6.5.   Vitamin-D level:  96.   Serum tryptase:  5.0.    Assessment:  1. Chronic recurrent  urticaria and angioedema, probably idiopathic, improved.   2. Hypothyroidism on replacement.   3. 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    4. 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    5. TIA 2001 on aspirin.   6. Hypertension on hydrochlorothiazide and amlodipine.    Recommendations:  1. Continue Zyrtec 20 milligrams twice a day.    2. Atarax as needed.  3. Pepcid twice a day as needed.   4. Prednisone refilled.   5.  Take pictures of any further lesions.   6. Return to clinic in two months or sooner if needed.      Patient education August 27, 2023:   Allergic mechanisms and treatment options were reviewed in detail. Urticaria and angioedema were reviewed. Xolair was discussed.

## 2024-03-18 ENCOUNTER — OFFICE VISIT (OUTPATIENT)
Dept: ALLERGY | Facility: CLINIC | Age: 79
End: 2024-03-18
Payer: MEDICARE

## 2024-03-18 VITALS — HEIGHT: 62 IN | BODY MASS INDEX: 31.8 KG/M2 | WEIGHT: 172.81 LBS

## 2024-03-18 DIAGNOSIS — E03.9 HYPOTHYROIDISM, UNSPECIFIED TYPE: ICD-10-CM

## 2024-03-18 DIAGNOSIS — L50.1 IDIOPATHIC URTICARIA: Primary | ICD-10-CM

## 2024-03-18 DIAGNOSIS — I10 HYPERTENSION, UNSPECIFIED TYPE: ICD-10-CM

## 2024-03-18 PROCEDURE — 99214 OFFICE O/P EST MOD 30 MIN: CPT | Mod: S$PBB,,, | Performed by: ALLERGY & IMMUNOLOGY

## 2024-03-18 PROCEDURE — 99213 OFFICE O/P EST LOW 20 MIN: CPT | Mod: PBBFAC | Performed by: ALLERGY & IMMUNOLOGY

## 2024-03-18 PROCEDURE — 99999 PR PBB SHADOW E&M-EST. PATIENT-LVL III: CPT | Mod: PBBFAC,,, | Performed by: ALLERGY & IMMUNOLOGY

## 2024-03-18 RX ORDER — MELOXICAM 15 MG/1
1 TABLET ORAL DAILY
COMMUNITY
Start: 2024-02-28 | End: 2024-05-14

## 2024-03-18 RX ORDER — NAPROXEN SODIUM 220 MG/1
81 TABLET, FILM COATED ORAL DAILY
COMMUNITY

## 2024-03-18 NOTE — PROGRESS NOTES
Radha Hinton returns to clinic today for continued evaluation of chronic idiopathic urticaria and angioedema.  She is here with her  Rakesh.  She was last seen January 24, 2024.    Since her last visit, she is done well. She has had only one episode of a rash that lasted less than a minute.  It was erythematous, slightly raised, and on her abdomen.    She was able to stop her Pepcid.  She has not needed any Atarax.  She has not needed any prednisone.     There is no association with any food, contact, or ingestion.     She continues to have pain in her right knee.  This does limit the amount that she can walk.  She had that knee replaced in 2023.  Her orthopedist is Dr. Gallo Villa.  She has follow-up with him in May. She did do postoperative physical therapy. She has not receiving any now.    Dr. Elizalde, her primary care physician, did lab work recently.  She had an elevated bilirubin level.  He is doing a liver and kidney ultrasound.    They are going to [place] to visit their daughter and grandchildren in mid May.    Physical Examination:  General: Well-developed, well-nourished, no acute distress.  Skin: Good turgor.  No urticaria or angioedema.  Neuro/Psych: Oriented x 3.    Pictures are reviewed on her cell phone on July 31, 2023 and they are consistent with urticaria.    Laboratory 07/18/2023:   ImmunoCAP dust mite: Negative.  CBC: Normal.   TSH:  1.493.   Thyroid peroxidase antibody level:  Less than 6.0.   Thyroglobulin antibody level:  Less than 4.0.   SPEP:  Normal.    Laboratory 07/31/2023:   Anti IgE receptor antibody level:  6.5.   Vitamin-D level:  96.   Serum tryptase:  5.0.    Assessment:  1. Chronic recurrent urticaria and angioedema, probably idiopathic, improved.   2. Hypothyroidism on replacement.   3. 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    4. Right knee replacement April 25, 2023 with continued pain.  5. TIA 2001 on aspirin.   6. Hypertension on hydrochlorothiazide and  amlodipine.  7. Recently elevated bilirubin.    Recommendations:  1. Begin to reduce the amount of Zyrtec that she takes.  She will go from for a day to three a day for a month.  If she is doing well then, reduce to 20 milligrams a day.  2. Atarax as needed.  3. She has prednisone at home.  4. Return to clinic in two months or sooner if needed.      Patient education August 27, 2023:   Allergic mechanisms and treatment options were reviewed in detail. Urticaria and angioedema were reviewed. Xolair was discussed.

## 2024-05-14 ENCOUNTER — OFFICE VISIT (OUTPATIENT)
Dept: ALLERGY | Facility: CLINIC | Age: 79
End: 2024-05-14
Payer: MEDICARE

## 2024-05-14 VITALS — HEIGHT: 62 IN | BODY MASS INDEX: 32.14 KG/M2 | WEIGHT: 174.63 LBS

## 2024-05-14 DIAGNOSIS — L50.1 IDIOPATHIC URTICARIA: Primary | ICD-10-CM

## 2024-05-14 DIAGNOSIS — E03.9 HYPOTHYROIDISM, UNSPECIFIED TYPE: ICD-10-CM

## 2024-05-14 DIAGNOSIS — I10 HYPERTENSION, UNSPECIFIED TYPE: ICD-10-CM

## 2024-05-14 PROCEDURE — 99213 OFFICE O/P EST LOW 20 MIN: CPT | Mod: PBBFAC | Performed by: ALLERGY & IMMUNOLOGY

## 2024-05-14 PROCEDURE — 99999 PR PBB SHADOW E&M-EST. PATIENT-LVL III: CPT | Mod: PBBFAC,,, | Performed by: ALLERGY & IMMUNOLOGY

## 2024-05-14 PROCEDURE — 99214 OFFICE O/P EST MOD 30 MIN: CPT | Mod: S$PBB,,, | Performed by: ALLERGY & IMMUNOLOGY

## 2024-05-14 RX ORDER — DULOXETINE HYDROCHLORIDE 30 MG/1
30 CAPSULE, DELAYED RELEASE ORAL DAILY PRN
COMMUNITY
Start: 2024-05-08

## 2024-05-14 NOTE — PROGRESS NOTES
Radha Hinton returns to clinic today for continued evaluation of chronic idiopathic urticaria and angioedema.  She is here with her  Rakesh. She was last seen March 18, 2024.     Since her last visit, she is done well.  She has been able to reduce her Zyrtec only two a day on most days.  There was one occasion when she had increased itching in the afternoon and had a social event that evening.     Otherwise she is well-controlled. She has not had any angioedema.    She has not needed any Atarax or prednisone. She has no longer taking any Pepcid.    She continues to have pain in her right knee.  She saw her orthopedist Dr. Gallo Villa last week.  They have not discussed a knee replacement.    She is going to [place] to visit her grandchildren next month.  They were not able to go as scheduled earlier this month.    Physical Examination:  General: Well-developed, well-nourished, no acute distress.  Skin: Good turgor.  No urticaria or angioedema.  Neuro/Psych: Oriented x 3.    Pictures are reviewed on her cell phone on July 31, 2023 and they are consistent with urticaria.    Laboratory 07/18/2023:   ImmunoCAP dust mite: Negative.  CBC: Normal.   TSH:  1.493.   Thyroid peroxidase antibody level:  Less than 6.0.   Thyroglobulin antibody level:  Less than 4.0.   SPEP:  Normal.    Laboratory 07/31/2023:   Anti IgE receptor antibody level:  6.5.   Vitamin-D level:  96.   Serum tryptase:  5.0.    Assessment:  1. Chronic recurrent urticaria and angioedema, probably idiopathic, improved.   2. Hypothyroidism on replacement.   3. 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    4. Right knee replacement April 25, 2023 with continued pain.  5. TIA 2001 on aspirin.   6. Hypertension on hydrochlorothiazide and amlodipine.  7. Recently elevated bilirubin.    Recommendations:  1. Zyrtec daily as needed. She may take up to four day as needed.  2. Atarax as needed.  3. She has prednisone at home for travel.  4. Return to clinic in two  months or sooner if needed.    Patient education August 27, 2023:   Allergic mechanisms and treatment options were reviewed in detail. Urticaria and angioedema were reviewed. Xolair was discussed.

## 2024-08-12 ENCOUNTER — OFFICE VISIT (OUTPATIENT)
Dept: ALLERGY | Facility: CLINIC | Age: 79
End: 2024-08-12
Payer: MEDICARE

## 2024-08-12 VITALS — BODY MASS INDEX: 31.68 KG/M2 | HEIGHT: 62 IN | WEIGHT: 172.19 LBS

## 2024-08-12 DIAGNOSIS — E03.9 HYPOTHYROIDISM, UNSPECIFIED TYPE: ICD-10-CM

## 2024-08-12 DIAGNOSIS — I10 HYPERTENSION, UNSPECIFIED TYPE: ICD-10-CM

## 2024-08-12 DIAGNOSIS — L50.1 IDIOPATHIC URTICARIA: Primary | ICD-10-CM

## 2024-08-12 PROCEDURE — 99214 OFFICE O/P EST MOD 30 MIN: CPT | Mod: S$PBB,,, | Performed by: ALLERGY & IMMUNOLOGY

## 2024-08-12 PROCEDURE — 99999 PR PBB SHADOW E&M-EST. PATIENT-LVL III: CPT | Mod: PBBFAC,,, | Performed by: ALLERGY & IMMUNOLOGY

## 2024-08-12 PROCEDURE — 99213 OFFICE O/P EST LOW 20 MIN: CPT | Mod: PBBFAC | Performed by: ALLERGY & IMMUNOLOGY

## 2024-08-12 NOTE — PROGRESS NOTES
Radha Hinton Returns to clinic today for continued evaluation of chronic urticaria and angioedema. She is here with her  Rakesh.  She was last seen May 14, 2024.     Since her last visit, she has done well. She has continued to take cetirizine 20 milligrams in the morning and 20 milligrams at night.  This usually controls her urticaria.     This past week she did have two episodes of lesions around her waist that were short-lived.     She has not had any angioedema.     She has not needed any Atarax or prednisone.    She returned from her relative's house in [place] and had some increased rhinitis.    Physical Examination:  General: Well-developed, well-nourished, no acute distress.  Skin: Good turgor.  No urticaria or angioedema.  Neuro/Psych: Oriented x 3.    Pictures are reviewed on her cell phone on July 31, 2023 and they are consistent with urticaria.    Laboratory 07/18/2023:   ImmunoCAP dust mite: Negative.  CBC: Normal.   TSH:  1.493.   Thyroid peroxidase antibody level:  Less than 6.0.   Thyroglobulin antibody level:  Less than 4.0.   SPEP:  Normal.    Laboratory 07/31/2023:   Anti IgE receptor antibody level:  6.5.   Vitamin-D level:  96.   Serum tryptase:  5.0.    Assessment:  1. Chronic recurrent urticaria and angioedema, probably idiopathic, improved.   2. Hypothyroidism on replacement.   3. 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    4. Right knee replacement April 25, 2023 with continued pain.  5. TIA 2001 on aspirin.   6. Hypertension on hydrochlorothiazide and amlodipine.  7. Recently elevated bilirubin.    Recommendations:  1. Continue Zyrtec 20 milligrams in the morning and reduce to 10 milligrams in the afternoon.  2. Atarax as needed.  3. She has prednisone at home for travel.  4. Return to clinic in 2 to 3 months or sooner if needed.    Patient education August 27, 2023:   Allergic mechanisms and treatment options were reviewed in detail. Urticaria and angioedema were reviewed. Xolair was  discussed.

## 2024-11-11 ENCOUNTER — OFFICE VISIT (OUTPATIENT)
Dept: ALLERGY | Facility: CLINIC | Age: 79
End: 2024-11-11
Payer: MEDICARE

## 2024-11-11 VITALS — BODY MASS INDEX: 31.8 KG/M2 | WEIGHT: 172.81 LBS | HEIGHT: 62 IN

## 2024-11-11 DIAGNOSIS — L50.1 IDIOPATHIC URTICARIA: Primary | ICD-10-CM

## 2024-11-11 DIAGNOSIS — E03.9 HYPOTHYROIDISM, UNSPECIFIED TYPE: ICD-10-CM

## 2024-11-11 DIAGNOSIS — I10 HYPERTENSION, UNSPECIFIED TYPE: ICD-10-CM

## 2024-11-11 PROCEDURE — 99214 OFFICE O/P EST MOD 30 MIN: CPT | Mod: S$PBB,,, | Performed by: ALLERGY & IMMUNOLOGY

## 2024-11-11 PROCEDURE — 99213 OFFICE O/P EST LOW 20 MIN: CPT | Mod: PBBFAC | Performed by: ALLERGY & IMMUNOLOGY

## 2024-11-11 PROCEDURE — 99999 PR PBB SHADOW E&M-EST. PATIENT-LVL III: CPT | Mod: PBBFAC,,, | Performed by: ALLERGY & IMMUNOLOGY

## 2024-11-11 NOTE — PROGRESS NOTES
Radha Hinton returns to clinic today for continued evaluation of chronic urticaria and angioedema. She is here with her  Rakesh.  She was last seen August 12, 2024.     After her last visit, she was doing well and she was able to reduce her Zyrtec to one twice a day.  She did not have any increased urticaria initially.    Her dermatologist Dr. Karmen Saldana removed a cyst from her back.  She needed to wear  a bandage over it.  She started having increased redness and itching around that area. She also had increased local urticarial lesions.  She did change the gauze and paper tape but continued to have mild symptoms.  She initially thought that it was the adhesive but now thinks it may have been the pressure of the bandage.  Since it has been off, that area has improved.     She also had a biopsy on her left wrist.  There was some increased erythema around there that has resolved.     She did to take an additional one or two Atarax during this time.    She is now back to taking Zyrtec 10 milligrams twice a day.    Her urticaria has been controlled. She has not had any angioedema.    Her rhinitis has been controlled.    She has a lot of relatives coming for Thanksgiving.     Rakesh has bilateral neuropathy of his lower extremities and had a recent fall at [employer].  He is following up with his neurologist.    Physical Examination:  General: Well-developed, well-nourished, no acute distress.  Skin: No urticaria or angioedema.  Neuro/Psych: Oriented x 3.    Pictures are reviewed on her cell phone on July 31, 2023 and they are consistent with urticaria.    Laboratory 07/18/2023:   ImmunoCAP dust mite: Negative.  CBC: Normal.   TSH:  1.493.   Thyroid peroxidase antibody level:  Less than 6.0.   Thyroglobulin antibody level:  Less than 4.0.   SPEP:  Normal.    Laboratory 07/31/2023:   Anti IgE receptor antibody level:  6.5.   Vitamin-D level:  96.   Serum tryptase:  5.0.    Assessment:  1. Chronic recurrent  urticaria and angioedema, probably idiopathic, improved.   2. Hypothyroidism on replacement.   3. 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    4. Right knee replacement April 25, 2023 with continued pain.  5. TIA 2001 on aspirin.   6. Hypertension on hydrochlorothiazide and amlodipine.  7. Recently elevated bilirubin.    Recommendations:  1. Continue Zyrtec 10 milligrams in the morning and 10 milligrams in the afternoon.  2. Atarax as needed.  3. She has prednisone at home for travel.  4. Return to clinic in three months or sooner if needed.    Patient education August 27, 2023:   Allergic mechanisms and treatment options were reviewed in detail. Urticaria and angioedema were reviewed. Xolair was discussed.

## 2025-01-27 ENCOUNTER — TELEPHONE (OUTPATIENT)
Dept: ALLERGY | Facility: CLINIC | Age: 80
End: 2025-01-27
Payer: MEDICARE

## 2025-01-27 RX ORDER — PREDNISONE 10 MG/1
TABLET ORAL
Qty: 21 TABLET | Refills: 0 | Status: SHIPPED | OUTPATIENT
Start: 2025-01-27

## 2025-01-27 NOTE — TELEPHONE ENCOUNTER
----- Message from Svetlana sent at 1/27/2025  9:08 AM CST -----  Regarding: Refill Request  Contact: MERCEDES WYATT [[id number]]  Refill Request    Type:  Rx Refill Request    Refill or New Rx:  Refill;    Rx Name and Strength: predniSONE (DELTASONE) 10 MG tablet        Preferred Pharmacy with phone number:    [facility]/pharmacy #[id number] - [place] - [address] [id number] [facility]  [place] [address]  Phone: [phone] Fax: [phone]    Local or Mail Order:  Local    Would the patient rather a call back or response vis [facility]?  Call Back    Best Call Back Number: [phone] (home)       Additional Information:  Pt stated she only have 3 days of meds left

## 2025-01-27 NOTE — TELEPHONE ENCOUNTER
I tried to reach out to patient. No answer, message left on voicemail. Calling to follow up on the prescription requested.

## 2025-01-27 NOTE — TELEPHONE ENCOUNTER
----- Message from Karla sent at 1/27/2025  2:15 PM CST -----  Name of Who is Calling:MERCEDES WYATT [[id number]]        What is the request in detail:Pt missed a call from office on today and requesting a call back regarding pt medication.        Can the clinic reply by [facility]:Call        What Number to Call Back if not in [facility]:Telephone Information:  Mobile          [phone]

## 2025-01-27 NOTE — TELEPHONE ENCOUNTER
"Spoke to patient. " I had a flare up last week with itching and hives. I started taking the atarax and pepcid with no relief. I had prednisone at home but only enough for 60 mg, 50 mg and 40 mg. I need enough for today 30 mg , then 20 mg and 10 mg. " Patient denies any hives today and has not had any episode of angioedema.   "

## 2025-02-10 ENCOUNTER — OFFICE VISIT (OUTPATIENT)
Dept: ALLERGY | Facility: CLINIC | Age: 80
End: 2025-02-10
Payer: MEDICARE

## 2025-02-10 VITALS
DIASTOLIC BLOOD PRESSURE: 73 MMHG | SYSTOLIC BLOOD PRESSURE: 136 MMHG | HEIGHT: 62 IN | BODY MASS INDEX: 32.25 KG/M2 | WEIGHT: 175.25 LBS

## 2025-02-10 DIAGNOSIS — E03.9 HYPOTHYROIDISM, UNSPECIFIED TYPE: ICD-10-CM

## 2025-02-10 DIAGNOSIS — I10 HYPERTENSION, UNSPECIFIED TYPE: ICD-10-CM

## 2025-02-10 DIAGNOSIS — L50.1 IDIOPATHIC URTICARIA: Primary | ICD-10-CM

## 2025-02-10 PROCEDURE — 99999 PR PBB SHADOW E&M-EST. PATIENT-LVL III: CPT | Mod: PBBFAC,,, | Performed by: ALLERGY & IMMUNOLOGY

## 2025-02-10 PROCEDURE — 99213 OFFICE O/P EST LOW 20 MIN: CPT | Mod: PBBFAC | Performed by: ALLERGY & IMMUNOLOGY

## 2025-02-10 PROCEDURE — 99214 OFFICE O/P EST MOD 30 MIN: CPT | Mod: S$PBB,,, | Performed by: ALLERGY & IMMUNOLOGY

## 2025-02-10 RX ORDER — FAMOTIDINE 20 MG/1
20 TABLET, FILM COATED ORAL 2 TIMES DAILY
Qty: 60 TABLET | Refills: 3 | Status: SHIPPED | OUTPATIENT
Start: 2025-02-10 | End: 2026-02-10

## 2025-02-10 RX ORDER — PREDNISONE 10 MG/1
TABLET ORAL
Qty: 21 TABLET | Refills: 0 | Status: SHIPPED | OUTPATIENT
Start: 2025-02-10

## 2025-02-10 RX ORDER — HYDROXYZINE HYDROCHLORIDE 25 MG/1
25 TABLET, FILM COATED ORAL DAILY
Qty: 30 TABLET | Refills: 5 | Status: SHIPPED | OUTPATIENT
Start: 2025-02-10

## 2025-02-10 NOTE — PROGRESS NOTES
Radha Hinton returns to clinic today for continued evaluation of chronic urticaria and angioedema. She is here with her  Rakesh.  She was last seen November 11, 2024.    After her last visit, she continued to do well without any urticaria on Zyrtec.  On December 4th she decreased her Zyrtec to 10 milligrams in the morning and 10 milligrams at night.  She then took 10 milligrams in the morning on December 5th and none that night.    On December 6 she had increased itching and restarted the Zyrtec at 10 milligrams twice a day.  Five days later she was continued to have increased itching and developed a rash.  She increased to 20 milligrams twice a day.    She then started adding Pepcid and occasionally Atarax at night.    She continued to have symptoms and took a prednisone course beginning January 24th.    She now is taking only Zyrtec 20 milligrams in the morning and 20 milligrams at night.  She has no longer needed any Pepcid or Atarax.    Physical Examination:  General: Well-developed, well-nourished, no acute distress.  Skin: No urticaria or angioedema.  Neuro/Psych: Oriented x 3.    Pictures are reviewed on her cell phone on July 31, 2023 and they are consistent with urticaria.    Laboratory 07/18/2023:   ImmunoCAP dust mite: Negative.  CBC: Normal.   TSH:  1.493.   Thyroid peroxidase antibody level:  Less than 6.0.   Thyroglobulin antibody level:  Less than 4.0.   SPEP:  Normal.    Laboratory 07/31/2023:   Anti IgE receptor antibody level:  6.5.   Vitamin-D level:  96.   Serum tryptase:  5.0.    Assessment:  1. Chronic recurrent urticaria and angioedema, probably idiopathic, improved.   2. Hypothyroidism on replacement.   3. 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    4. Right knee replacement April 25, 2023 with continued pain.  5. TIA 2001 on aspirin.   6. Hypertension on hydrochlorothiazide and amlodipine.  7. Recently elevated bilirubin.    Recommendations:  1. Continue Zyrtec 20 milligrams in the  morning and 20 milligrams in the afternoon.  2. In a few days when doing well, begin to reduce by 10 milligrams in the morning and 10 milligrams in the afternoon.  3. Prednisone refilled.  4. Return to clinic in three months or sooner if needed.    Patient education August 27, 2023:   Allergic mechanisms and treatment options were reviewed in detail. Urticaria and angioedema were reviewed. Xolair was discussed.

## 2025-05-27 ENCOUNTER — OFFICE VISIT (OUTPATIENT)
Dept: ALLERGY | Facility: CLINIC | Age: 80
End: 2025-05-27
Payer: MEDICARE

## 2025-05-27 VITALS — WEIGHT: 175.5 LBS | BODY MASS INDEX: 32.3 KG/M2 | HEIGHT: 62 IN

## 2025-05-27 DIAGNOSIS — T78.3XXD ANGIOEDEMA, SUBSEQUENT ENCOUNTER: ICD-10-CM

## 2025-05-27 DIAGNOSIS — L50.1 IDIOPATHIC URTICARIA: Primary | ICD-10-CM

## 2025-05-27 DIAGNOSIS — E03.9 HYPOTHYROIDISM, UNSPECIFIED TYPE: ICD-10-CM

## 2025-05-27 PROCEDURE — 99213 OFFICE O/P EST LOW 20 MIN: CPT | Mod: PBBFAC | Performed by: ALLERGY & IMMUNOLOGY

## 2025-05-27 PROCEDURE — 99999 PR PBB SHADOW E&M-EST. PATIENT-LVL III: CPT | Mod: PBBFAC,,, | Performed by: ALLERGY & IMMUNOLOGY

## 2025-05-27 PROCEDURE — 99214 OFFICE O/P EST MOD 30 MIN: CPT | Mod: S$PBB,,, | Performed by: ALLERGY & IMMUNOLOGY

## 2025-05-27 NOTE — PROGRESS NOTES
Radha Hinton returns to clinic today for continued evaluation of chronic urticaria and angioedema. She is here alone.  She was last seen February 10, 2025.     Several weeks ago Rakesh fell down at the grocery store and broke his left arm. He had to have surgery on it and is still recovering.  They had to cancel the trip to [place] [facility] [place] [facility] [place] for a family wedding.  He hopes to start PT in for five weeks.     She continues to have recurrent urticarial lesions that are red, raised, and pruritic.  She also has recurrent itching.     She has tried on multiple occasions to reduce her Zyrtec.  She has gone several days with only 10 milligrams twice a day. She then starts to have increased symptoms and increases it to 20 milligrams twice a day.     She has a occasionally he Atarax both in the morning at night.     She has not had any need for prednisone.     She is considering Xolair.    Physical Examination:  General: Well-developed, well-nourished, no acute distress.  Skin: No urticaria or angioedema.  Neuro/Psych: Oriented x 3.    Pictures are reviewed on her cell phone on July 31, 2023 and they are consistent with urticaria.    Laboratory 07/18/2023:   ImmunoCAP dust mite: Negative.  CBC: Normal.   TSH:  1.493.   Thyroid peroxidase antibody level:  Less than 6.0.   Thyroglobulin antibody level:  Less than 4.0.   SPEP:  Normal.    Laboratory 07/31/2023:   Anti IgE receptor antibody level:  6.5.   Vitamin-D level:  96.   Serum tryptase:  5.0.    Assessment:  1. Chronic recurrent urticaria and angioedema, probably idiopathic, improved.   2. Hypothyroidism on replacement.   3. 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    4. Right knee replacement April 25, 2023 with continued pain.  5. TIA 2001 on aspirin.   6. Hypertension on hydrochlorothiazide and amlodipine.  7. Recently elevated bilirubin.    Recommendations:  1. Continue Zyrtec 10-20 milligrams in the morning and 10-20 milligrams in the afternoon.  2. Consider Xolair. This was  again discussed.  3. Prednisone refilled.  4. Return to clinic in three months or sooner if needed.    Patient education August 27, 2023:   Allergic mechanisms and treatment options were reviewed in detail. Urticaria and angioedema were reviewed. Xolair was discussed.

## 2025-06-11 ENCOUNTER — TELEPHONE (OUTPATIENT)
Dept: ALLERGY | Facility: CLINIC | Age: 80
End: 2025-06-11
Payer: MEDICARE